# Patient Record
Sex: FEMALE | Race: WHITE | NOT HISPANIC OR LATINO
[De-identification: names, ages, dates, MRNs, and addresses within clinical notes are randomized per-mention and may not be internally consistent; named-entity substitution may affect disease eponyms.]

---

## 2017-09-22 ENCOUNTER — APPOINTMENT (OUTPATIENT)
Dept: NEUROSURGERY | Facility: CLINIC | Age: 52
End: 2017-09-22
Payer: MEDICARE

## 2017-09-22 VITALS
HEIGHT: 72 IN | WEIGHT: 168 LBS | BODY MASS INDEX: 22.75 KG/M2 | SYSTOLIC BLOOD PRESSURE: 130 MMHG | DIASTOLIC BLOOD PRESSURE: 60 MMHG

## 2017-09-22 PROBLEM — Z00.00 ENCOUNTER FOR PREVENTIVE HEALTH EXAMINATION: Status: ACTIVE | Noted: 2017-09-22

## 2017-09-22 PROCEDURE — 99204 OFFICE O/P NEW MOD 45 MIN: CPT

## 2017-10-20 ENCOUNTER — TRANSCRIPTION ENCOUNTER (OUTPATIENT)
Age: 52
End: 2017-10-20

## 2017-10-24 ENCOUNTER — TRANSCRIPTION ENCOUNTER (OUTPATIENT)
Age: 52
End: 2017-10-24

## 2019-05-06 ENCOUNTER — EMERGENCY (EMERGENCY)
Facility: HOSPITAL | Age: 54
LOS: 1 days | End: 2019-05-06
Admitting: EMERGENCY MEDICINE
Payer: MEDICARE

## 2019-05-06 PROCEDURE — 99284 EMERGENCY DEPT VISIT MOD MDM: CPT

## 2019-05-06 PROCEDURE — 93010 ELECTROCARDIOGRAM REPORT: CPT

## 2021-08-30 ENCOUNTER — APPOINTMENT (OUTPATIENT)
Dept: CARDIOLOGY | Facility: CLINIC | Age: 56
End: 2021-08-30
Payer: MEDICARE

## 2021-08-30 ENCOUNTER — NON-APPOINTMENT (OUTPATIENT)
Age: 56
End: 2021-08-30

## 2021-08-30 VITALS
TEMPERATURE: 97.8 F | HEIGHT: 72 IN | HEART RATE: 70 BPM | BODY MASS INDEX: 20.86 KG/M2 | WEIGHT: 154 LBS | DIASTOLIC BLOOD PRESSURE: 60 MMHG | OXYGEN SATURATION: 99 % | SYSTOLIC BLOOD PRESSURE: 98 MMHG

## 2021-08-30 VITALS — SYSTOLIC BLOOD PRESSURE: 94 MMHG | DIASTOLIC BLOOD PRESSURE: 64 MMHG

## 2021-08-30 DIAGNOSIS — Z78.9 OTHER SPECIFIED HEALTH STATUS: ICD-10-CM

## 2021-08-30 DIAGNOSIS — Z82.49 FAMILY HISTORY OF ISCHEMIC HEART DISEASE AND OTHER DISEASES OF THE CIRCULATORY SYSTEM: ICD-10-CM

## 2021-08-30 DIAGNOSIS — Z72.3 LACK OF PHYSICAL EXERCISE: ICD-10-CM

## 2021-08-30 DIAGNOSIS — Z87.898 PERSONAL HISTORY OF OTHER SPECIFIED CONDITIONS: ICD-10-CM

## 2021-08-30 DIAGNOSIS — Z83.3 FAMILY HISTORY OF DIABETES MELLITUS: ICD-10-CM

## 2021-08-30 DIAGNOSIS — Z80.9 FAMILY HISTORY OF MALIGNANT NEOPLASM, UNSPECIFIED: ICD-10-CM

## 2021-08-30 PROCEDURE — 93242 EXT ECG>48HR<7D RECORDING: CPT

## 2021-08-30 PROCEDURE — 93000 ELECTROCARDIOGRAM COMPLETE: CPT | Mod: 59

## 2021-08-30 PROCEDURE — 99204 OFFICE O/P NEW MOD 45 MIN: CPT

## 2021-08-30 RX ORDER — EPINEPHRINE 0.3 MG/.3ML
0.3 INJECTION INTRAMUSCULAR
Qty: 2 | Refills: 0 | Status: ACTIVE | COMMUNITY
Start: 2021-03-15

## 2021-08-30 RX ORDER — LIDOCAINE HCL/PF 200MG/10ML
200 SYRINGE (ML) INJECTION
Refills: 0 | Status: ACTIVE | COMMUNITY
Start: 2021-08-30

## 2021-08-30 RX ORDER — BUPIVACAINE HYDROCHLORIDE 2.5 MG/ML
0.25 INJECTION, SOLUTION INFILTRATION; PERINEURAL
Refills: 0 | Status: ACTIVE | COMMUNITY
Start: 2021-08-30

## 2021-08-30 NOTE — DISCUSSION/SUMMARY
[FreeTextEntry1] : Patient with episode of syncope in June 2021 after a certain kind of fish that caused upset stomach, nausea, followed by cold sweats and then brief syncope.  Most likely, this is vasovagal syncope.  We  discussed pathophysiology and ways to avoid it.  Adequate hydration and normalizing blood pressure would be important.\par \par To rule out any arrhythmias, Zio recording will be initiated for few days.  She wants to limit recording duration to under 4 days as she likes to swim.  Check echocardiogram also -mostly for RV function and size\par \par She has history of hypotension.  Her systolic blood pressure is adequate today without any significant postural change\par \par Patient has lost 10 pounds in past few months.  She does not know why.  If she continues to lose weight, she should discuss this with primary care office for further evaluation.  She feels that she has dysautonomia which affects her appetite.  No recent nausea or vomiting.  Avoiding further weight loss would also help stabilizing her blood pressure\par \par She is declining ETT.  She had ETT several years ago which caused severe pain in the pelvis and then prolapse.  Will bring us a copy of recent fasting lipid profile.\par \par I will follow her after above tests\par \par Thank you for this referral and allowing me to participate in the care of this patient.  If I can be of any further help or  if you have any questions, please do not hesitate to contact me\par \par \par Sincerely,\par \par Elvis Lehman MD, [facility], [facility]

## 2021-08-30 NOTE — REASON FOR VISIT
[FreeTextEntry1] : Chen is a pleasant 56-year-old female with history of chronic mild hypotension, cervical spondylosis, possible autonomic neuropathy.  She had  an episode of syncope in June 2021 while at the dining table after having food poisoning with some specific fish.  She had upset stomach, felt nauseous, had cold sweats and then passed out briefly.  There was no preceding palpitation, chest pain or shortness of breath.  She had an episode of vagal syncope as a teenager several decades ago.  No recurrence of presyncope or syncope in the past few months\par \par For unknown reasons, she has lost about 10 pounds in the past few months.  She has history of low blood pressure and has been told to take liberal amounts of water\par \par No past history of CAD, CHF, TIA.  She does not have significant palpitations.

## 2021-08-30 NOTE — REVIEW OF SYSTEMS
[Negative] : Cardiovascular [FreeTextEntry5] : syncope - see HPI [de-identified] : Autonomic neuropathy?

## 2021-09-09 ENCOUNTER — NON-APPOINTMENT (OUTPATIENT)
Age: 56
End: 2021-09-09

## 2021-09-13 PROCEDURE — 93244 EXT ECG>48HR<7D REV&INTERPJ: CPT

## 2021-10-04 ENCOUNTER — APPOINTMENT (OUTPATIENT)
Dept: CARDIOLOGY | Facility: CLINIC | Age: 56
End: 2021-10-04
Payer: MEDICARE

## 2021-10-04 VITALS
DIASTOLIC BLOOD PRESSURE: 72 MMHG | HEART RATE: 80 BPM | SYSTOLIC BLOOD PRESSURE: 104 MMHG | HEIGHT: 72 IN | OXYGEN SATURATION: 99 % | WEIGHT: 157 LBS | TEMPERATURE: 98 F | BODY MASS INDEX: 21.26 KG/M2

## 2021-10-04 DIAGNOSIS — Z98.890 OTHER SPECIFIED POSTPROCEDURAL STATES: ICD-10-CM

## 2021-10-04 PROCEDURE — 99215 OFFICE O/P EST HI 40 MIN: CPT

## 2021-10-04 PROCEDURE — 93306 TTE W/DOPPLER COMPLETE: CPT

## 2021-10-04 NOTE — DISCUSSION/SUMMARY
[FreeTextEntry1] : Patient with episode of syncope in June 2021 after a certain kind of fish that caused upset stomach, nausea, followed by cold sweats and then brief syncope.  Most likely, this is vasovagal syncope.  We  discussed pathophysiology and ways to avoid it.  Adequate hydration and normalizing blood pressure would be important.\par \par Event recorder showed wide complex tachycardia, asymptomatic.  NSVT cannot be excluded however it was irregular and probably aberrancy\par \par Echocardiogram today, showed normal chamber size and normal diastolic function.  Normal EF.  Mild segmental wall motion abilities such as hypokinesis of basal mid anteroseptal and inferior regions was reported.\par \par She has history of hypotension.  Her systolic blood pressure is adequate today without any significant postural change.  Unable to give beta-blockers\par \par Further weight loss has been avoided and she has gained some weight\par \par She is declining ETT.  She had ETT several years ago which caused severe pain in the pelvis and then prolapse.  Will bring us a copy of recent fasting lipid profile.\par \par She would benefit from further evaluation with cardiac MRI.\par \par I will follow her after above tests\par \par Thank you for this referral and allowing me to participate in the care of this patient.  If I can be of any further help or  if you have any questions, please do not hesitate to contact me\par \par \par Sincerely,\par \par Elvis Lehman MD, [facility], [facility]

## 2021-10-04 NOTE — REVIEW OF SYSTEMS
[Negative] : Heme/Lymph [FreeTextEntry5] : syncope - see HPI [de-identified] : Autonomic neuropathy?

## 2021-10-13 ENCOUNTER — EMERGENCY (EMERGENCY)
Facility: HOSPITAL | Age: 56
LOS: 1 days | End: 2021-10-13
Admitting: EMERGENCY MEDICINE
Payer: MEDICARE

## 2021-10-13 PROCEDURE — 99285 EMERGENCY DEPT VISIT HI MDM: CPT

## 2021-10-13 PROCEDURE — 70450 CT HEAD/BRAIN W/O DYE: CPT | Mod: 26

## 2021-10-13 PROCEDURE — 71045 X-RAY EXAM CHEST 1 VIEW: CPT | Mod: 26

## 2021-10-13 PROCEDURE — 93010 ELECTROCARDIOGRAM REPORT: CPT

## 2021-10-14 ENCOUNTER — APPOINTMENT (OUTPATIENT)
Dept: CARDIOLOGY | Facility: CLINIC | Age: 56
End: 2021-10-14
Payer: MEDICARE

## 2021-10-14 ENCOUNTER — NON-APPOINTMENT (OUTPATIENT)
Age: 56
End: 2021-10-14

## 2021-10-14 VITALS
WEIGHT: 154 LBS | SYSTOLIC BLOOD PRESSURE: 130 MMHG | OXYGEN SATURATION: 98 % | TEMPERATURE: 96.2 F | HEIGHT: 72 IN | HEART RATE: 114 BPM | BODY MASS INDEX: 20.86 KG/M2 | DIASTOLIC BLOOD PRESSURE: 78 MMHG

## 2021-10-14 PROCEDURE — 93242 EXT ECG>48HR<7D RECORDING: CPT

## 2021-10-14 PROCEDURE — 99215 OFFICE O/P EST HI 40 MIN: CPT

## 2021-10-14 PROCEDURE — 99214 OFFICE O/P EST MOD 30 MIN: CPT

## 2021-10-14 PROCEDURE — 93000 ELECTROCARDIOGRAM COMPLETE: CPT | Mod: 59

## 2021-10-14 NOTE — REVIEW OF SYSTEMS
[Negative] : Heme/Lymph [FreeTextEntry5] : syncope - see HPI [de-identified] : Autonomic neuropathy?

## 2021-10-14 NOTE — REASON FOR VISIT
[FreeTextEntry1] : Chen is a pleasant 56-year-old female with history of chronic mild hypotension, cervical spondylosis, possible autonomic neuropathy.  She had  an episode of syncope in June 2021 while at the dining table after having food poisoning with some specific fish.  She had upset stomach, felt nauseous, had cold sweats and then passed out briefly.  There was no preceding palpitation, chest pain or shortness of breath.  She had an episode of vagal syncope as a teenager several decades ago.  No recurrence of presyncope or syncope in the past few months\par \par For unknown reasons, she has lost about 10 pounds in the past few months.  She has history of low blood pressure and has been told to take liberal amounts of water\par \par The patient had recent admission to the hospital with episode of syncope.  She was admitted overnight.  She ruled out for acute coronary syndrome.  No evidence of any cardiac arrhythmia.  She had a recent cardiac MRI which was reviewed.  No evidence of any structural heart disease.  Occasional palpitations.  Continues to have dizzy spells.\par \par No past history of CAD, CHF, TIA.  She does not have significant palpitations.

## 2021-10-14 NOTE — DISCUSSION/SUMMARY
[FreeTextEntry1] : Patient with episode of syncope in June 2021 after a certain kind of fish that caused upset stomach, nausea, followed by cold sweats and then brief syncope.  Most likely, this is vasovagal syncope.  We  discussed pathophysiology and ways to avoid it.  Adequate hydration and normalizing blood pressure would be important.\par \par Event recorder showed wide complex tachycardia, asymptomatic.  NSVT cannot be excluded however it was irregular and probably aberrancy\par \par Echocardiogram today, showed normal chamber size and normal diastolic function.  Normal EF.  Mild segmental wall motion abilities such as hypokinesis of basal mid anteroseptal and inferior regions was reported.\par \par She has history of hypotension.  Her systolic blood pressure is adequate today without any significant postural change.  Unable to give beta-blockers\par \par Further weight loss has been avoided and she has gained some weight\par \par She is declining ETT.  She had ETT several years ago which caused severe pain in the pelvis and then prolapse.  Will bring us a copy of recent fasting lipid profile.\par \par She would benefit from further evaluation with cardiac MRI.\par \par Hospital records were reviewed.  No evidence of acute coronary syndrome or any significant cardiac arrhythmia.  ECG reviewed.  Cardiac MRI reviewed.  No evidence of any structural heart disease.  Increase hydration discussed with the patient.  ECG shows frequent PVCs.  Zio patch will be done for 1 week to rule out any sustained cardiac arrhythmia causing the symptoms.  Follow-up after Zio patch.  I also advised that she will follow-up with neurology.

## 2021-10-14 NOTE — ASSESSMENT
[FreeTextEntry1] : Reviewed today:\par -EKG 8/30/2021: Sinus rhythm, unremarkable.\par Hospital records reviewed.  Recent admission overnight for dizziness.  No evidence of acute coronary syndrome.

## 2021-10-27 ENCOUNTER — NON-APPOINTMENT (OUTPATIENT)
Age: 56
End: 2021-10-27

## 2021-10-28 PROCEDURE — 93244 EXT ECG>48HR<7D REV&INTERPJ: CPT

## 2021-11-15 ENCOUNTER — NON-APPOINTMENT (OUTPATIENT)
Age: 56
End: 2021-11-15

## 2021-11-16 ENCOUNTER — APPOINTMENT (OUTPATIENT)
Dept: CARDIOLOGY | Facility: CLINIC | Age: 56
End: 2021-11-16

## 2021-11-18 ENCOUNTER — APPOINTMENT (OUTPATIENT)
Dept: CARDIOLOGY | Facility: CLINIC | Age: 56
End: 2021-11-18
Payer: MEDICARE

## 2021-11-18 VITALS
OXYGEN SATURATION: 98 % | HEIGHT: 72 IN | TEMPERATURE: 97.5 F | HEART RATE: 87 BPM | DIASTOLIC BLOOD PRESSURE: 60 MMHG | BODY MASS INDEX: 20.86 KG/M2 | WEIGHT: 154 LBS | SYSTOLIC BLOOD PRESSURE: 100 MMHG

## 2021-11-18 DIAGNOSIS — R55 SYNCOPE AND COLLAPSE: ICD-10-CM

## 2021-11-18 DIAGNOSIS — I95.9 HYPOTENSION, UNSPECIFIED: ICD-10-CM

## 2021-11-18 DIAGNOSIS — I42.9 CARDIOMYOPATHY, UNSPECIFIED: ICD-10-CM

## 2021-11-18 DIAGNOSIS — M48.02 SPINAL STENOSIS, CERVICAL REGION: ICD-10-CM

## 2021-11-18 DIAGNOSIS — G90.9 DISORDER OF THE AUTONOMIC NERVOUS SYSTEM, UNSPECIFIED: ICD-10-CM

## 2021-11-18 PROCEDURE — 99214 OFFICE O/P EST MOD 30 MIN: CPT

## 2021-11-18 RX ORDER — ALPRAZOLAM 0.25 MG/1
0.25 TABLET ORAL DAILY
Refills: 0 | Status: ACTIVE | COMMUNITY

## 2021-11-18 NOTE — DISCUSSION/SUMMARY
[FreeTextEntry1] : Patient with episode of syncope in June 2021 after a certain kind of fish that caused upset stomach, nausea, followed by cold sweats and then brief syncope.  Most likely, this is vasovagal syncope.  We  discussed pathophysiology and ways to avoid it.  Adequate hydration and normalizing blood pressure would be important.\par \par Event recorder in August 2021 showed wide complex tachycardia, asymptomatic.  NSVT cannot be excluded - it was irregular and probably aberrancy.  However, on beta-blockers, repeat ZIO recording in mid October 2021 again showed NSVT and PVC burden of 2.3%.  Unfortunately, due to low blood pressure, beta-blocker dose had to be reduced.\par \par She canceled her  Lexiscan stress test and SPECT last week.  She does not have any chest pain.  She did have an episode of tachycardia and anxiety with heart rate of 160 lasting 20 minutes for which she went to the ER on October 13, 2021 at [facility].  We requested a copy of those records.\par \par She scheduled to see Dr. August  at [place] for EP consultation and arrhythmia management.  Patient also wants to have a tilt table testing which she will have at [place].\par \par Echocardiogram this year, showed normal chamber size and normal diastolic function.  Normal EF.  Mild segmental wall motion abilities such as hypokinesis of basal mid anteroseptal and inferior regions was reported-however cardiac MRI was unremarkable.\par \par She has history of hypotension.  Her systolic blood pressure is borderline adequate today without any significant postural change.  Unable to increase blockers\par \par She is declining ETT.  She had ETT several years ago which caused severe pain in the pelvis and then prolapse.  \par \par She will bring us a copy of recent fasting lipid profile.\par \par Cardiac MRI: Normal LVEF and wall motion.  No late gadolinium enhancement.  RV function and structure normal.\par \par Patient will call to make further appointments once she consults and has tests with Dr. August \par \par Thank you for this referral and allowing me to participate in the care of this patient.  If I can be of any further help or  if you have any questions, please do not hesitate to contact me\par \par \[place] Sincerely,\par \par Elvis Lehman MD, [facility], [facility]

## 2021-11-18 NOTE — ASSESSMENT
[FreeTextEntry1] : Reviewed today:\par -EKG 8/30/2021: Sinus rhythm, unremarkable.\par -Zio recording for 5 to 6 days in October 2021: She had 1 run of NSVT, 7 beats, rate around 178, asymptomatic.  Occasional PVC throughout the monitoring at 2.3% burden.\par -ZIO recording August 2021: PVC burden was 1.4%.  She had asymptomatic NSVT, 9 beats.\par -Cardiac MRI October 2021: Unremarkable

## 2021-11-18 NOTE — REVIEW OF SYSTEMS
[Negative] : Heme/Lymph [FreeTextEntry5] : syncope - see HPI [de-identified] : Autonomic neuropathy?

## 2021-11-18 NOTE — REASON FOR VISIT
[FreeTextEntry1] : Chen is a pleasant 56-year-old female with history of chronic mild hypotension, cervical spondylosis, possible autonomic neuropathy.  She had  an episode of syncope in June 2021 while at the dining table after having food poisoning with some specific fish.  She had upset stomach, felt nauseous, had cold sweats and then passed out briefly.  There was no preceding palpitation, chest pain or shortness of breath.  She had an episode of vagal syncope as a teenager several decades ago.  No recurrence of presyncope or syncope in the past few months\par \par For unknown reasons, she had lost about 10 pounds in the  few months end of summer however the weight is now steady.  She has history of low blood pressure and has been told to take liberal amounts of water\par \par She was on 25 mg of metoprolol.  She reduced the dose to half a tablet.  Systolic blood pressure is 100 mmHg today.\par \par No past history of CAD, CHF, TIA.  She does not have significant palpitations.\par \par ZIO recording mid-October again showed NSVT, asymptomatic.  PVC burden has increased to 2.4% in October 2021\par \par Patient has several nonspecific and vague symptoms which are difficult to correlate and not consistent.  He admits to anxiety.  She is going to see psychiatry.

## 2022-02-08 RX ORDER — METOPROLOL SUCCINATE 25 MG/1
25 TABLET, EXTENDED RELEASE ORAL DAILY
Qty: 45 | Refills: 2 | Status: ACTIVE | COMMUNITY
Start: 2021-10-27 | End: 1900-01-01

## 2022-05-17 ENCOUNTER — APPOINTMENT (OUTPATIENT)
Dept: CARDIOLOGY | Facility: CLINIC | Age: 57
End: 2022-05-17

## 2022-09-14 ENCOUNTER — NON-APPOINTMENT (OUTPATIENT)
Age: 57
End: 2022-09-14

## 2022-09-14 ENCOUNTER — APPOINTMENT (OUTPATIENT)
Dept: OPHTHALMOLOGY | Facility: CLINIC | Age: 57
End: 2022-09-14

## 2022-09-14 PROCEDURE — 99204 OFFICE O/P NEW MOD 45 MIN: CPT

## 2023-03-22 ENCOUNTER — APPOINTMENT (OUTPATIENT)
Dept: VASCULAR SURGERY | Facility: CLINIC | Age: 58
End: 2023-03-22
Payer: MEDICARE

## 2023-03-22 VITALS
WEIGHT: 160 LBS | HEIGHT: 72 IN | SYSTOLIC BLOOD PRESSURE: 100 MMHG | DIASTOLIC BLOOD PRESSURE: 72 MMHG | BODY MASS INDEX: 21.67 KG/M2

## 2023-03-22 PROCEDURE — 99203 OFFICE O/P NEW LOW 30 MIN: CPT

## 2023-03-22 NOTE — PHYSICAL EXAM
[JVD] : no jugular venous distention  [Carotid Bruits] : no carotid bruits [Normal Breath Sounds] : Normal breath sounds [Normal Heart Sounds] : normal heart sounds [Normal Rate and Rhythm] : normal rate and rhythm [2+] : left 2+ [Ankle Swelling (On Exam)] : not present [Varicose Veins Of Lower Extremities] : bilaterally [Ankle Swelling On The Right] : mild [] : not present [Abdomen Masses] : No abdominal masses [Abdomen Tenderness] : ~T ~M No abdominal tenderness [No Rash or Lesion] : No rash or lesion [Anxious] : anxious [de-identified] : Very emotional

## 2023-03-22 NOTE — HISTORY OF PRESENT ILLNESS
[FreeTextEntry1] : 58-year-old white female is being self-referred to be evaluated for possible vascular problems.  She suffers from multiple autoimmune diseases associated with chronic pain, neuralgias and hypersensitivity.  She is concerned about circulation to the legs.

## 2023-03-22 NOTE — REVIEW OF SYSTEMS
[Fever] : no fever [Chills] : no chills [Feeling Poorly] : not feeling poorly [Feeling Tired] : not feeling tired [Chest Pain] : no chest pain [Palpitations] : no palpitations [Leg Claudication] : no intermittent leg claudication [Lower Ext Edema] : no lower extremity edema [Limb Pain] : limb pain [Limb Swelling] : no limb swelling [Limb Weakness] : limb weakness [Difficulty Walking] : difficulty walking [Anxiety] : anxiety [Emotional Problems] : emotional problems [Negative] : Respiratory

## 2023-03-22 NOTE — ASSESSMENT
[FreeTextEntry1] : No evidence of arterial insufficiency.  I recommended to obtain bilateral venous sonogram.

## 2023-03-28 ENCOUNTER — APPOINTMENT (OUTPATIENT)
Dept: VASCULAR SURGERY | Facility: CLINIC | Age: 58
End: 2023-03-28
Payer: MEDICARE

## 2023-03-28 VITALS
BODY MASS INDEX: 21.67 KG/M2 | SYSTOLIC BLOOD PRESSURE: 106 MMHG | DIASTOLIC BLOOD PRESSURE: 70 MMHG | HEIGHT: 72 IN | WEIGHT: 160 LBS

## 2023-03-28 DIAGNOSIS — I83.90 ASYMPTOMATIC VARICOSE VEINS OF UNSPECIFIED LOWER EXTREMITY: ICD-10-CM

## 2023-03-28 PROCEDURE — 93970 EXTREMITY STUDY: CPT

## 2023-03-28 PROCEDURE — 99213 OFFICE O/P EST LOW 20 MIN: CPT

## 2023-03-28 NOTE — PHYSICAL EXAM
[JVD] : no jugular venous distention  [Normal Breath Sounds] : Normal breath sounds [1+] : left 1+ [Ankle Swelling (On Exam)] : not present [Varicose Veins Of Lower Extremities] : not present [] : not present [No Rash or Lesion] : No rash or lesion [de-identified] : No acute distress

## 2023-03-28 NOTE — REVIEW OF SYSTEMS
[Fever] : no fever [Chills] : no chills [Feeling Tired] : feeling tired [Leg Claudication] : no intermittent leg claudication [Lower Ext Edema] : no lower extremity edema [Shortness Of Breath] : no shortness of breath [Abdominal Pain] : no abdominal pain [Arthralgias] : arthralgias [Limb Pain] : limb pain [Negative] : ENT

## 2023-03-28 NOTE — HISTORY OF PRESENT ILLNESS
[FreeTextEntry1] : Patient had bilateral venous ultrasound done which was negative for DVT.  There is no evidence of phlebitis.

## 2023-03-28 NOTE — ASSESSMENT
[FreeTextEntry1] : Patient is recommended to wear compression stockings.  Follow-up appointment on as needed basis.

## 2024-08-21 ENCOUNTER — NON-APPOINTMENT (OUTPATIENT)
Age: 59
End: 2024-08-21

## 2024-08-21 ENCOUNTER — APPOINTMENT (OUTPATIENT)
Dept: OPHTHALMOLOGY | Facility: CLINIC | Age: 59
End: 2024-08-21
Payer: MEDICARE

## 2024-08-21 PROCEDURE — 92014 COMPRE OPH EXAM EST PT 1/>: CPT

## 2025-02-04 ENCOUNTER — APPOINTMENT (OUTPATIENT)
Dept: VASCULAR SURGERY | Facility: CLINIC | Age: 60
End: 2025-02-04
Payer: MEDICARE

## 2025-02-04 VITALS
SYSTOLIC BLOOD PRESSURE: 124 MMHG | HEIGHT: 72 IN | HEART RATE: 82 BPM | OXYGEN SATURATION: 99 % | DIASTOLIC BLOOD PRESSURE: 74 MMHG | WEIGHT: 150 LBS | BODY MASS INDEX: 20.32 KG/M2

## 2025-02-04 DIAGNOSIS — G62.9 POLYNEUROPATHY, UNSPECIFIED: ICD-10-CM

## 2025-02-04 DIAGNOSIS — F32.A DEPRESSION, UNSPECIFIED: ICD-10-CM

## 2025-02-04 DIAGNOSIS — I87.2 VENOUS INSUFFICIENCY (CHRONIC) (PERIPHERAL): ICD-10-CM

## 2025-02-04 DIAGNOSIS — I83.90 ASYMPTOMATIC VARICOSE VEINS OF UNSPECIFIED LOWER EXTREMITY: ICD-10-CM

## 2025-02-04 PROCEDURE — 99213 OFFICE O/P EST LOW 20 MIN: CPT

## 2025-02-04 RX ORDER — AMMONIUM LACTATE 12 %
12 CREAM (GRAM) TOPICAL
Refills: 0 | Status: ACTIVE | COMMUNITY

## 2025-02-04 RX ORDER — MIDODRINE HYDROCHLORIDE 2.5 MG/1
2.5 TABLET ORAL
Refills: 0 | Status: ACTIVE | COMMUNITY

## 2025-02-04 RX ORDER — CEVIMELINE HYDROCHLORIDE 30 MG/1
30 CAPSULE ORAL
Refills: 0 | Status: ACTIVE | COMMUNITY

## 2025-02-04 RX ORDER — HYDROCORTISONE 25 MG/G
2.5 CREAM TOPICAL
Refills: 0 | Status: ACTIVE | COMMUNITY

## 2025-03-04 ENCOUNTER — APPOINTMENT (OUTPATIENT)
Dept: VASCULAR SURGERY | Facility: CLINIC | Age: 60
End: 2025-03-04
Payer: MEDICARE

## 2025-03-04 VITALS
DIASTOLIC BLOOD PRESSURE: 62 MMHG | BODY MASS INDEX: 20.45 KG/M2 | HEART RATE: 81 BPM | HEIGHT: 72 IN | WEIGHT: 151 LBS | SYSTOLIC BLOOD PRESSURE: 98 MMHG | OXYGEN SATURATION: 97 %

## 2025-03-04 DIAGNOSIS — I87.2 VENOUS INSUFFICIENCY (CHRONIC) (PERIPHERAL): ICD-10-CM

## 2025-03-04 DIAGNOSIS — Z79.899 OTHER LONG TERM (CURRENT) DRUG THERAPY: ICD-10-CM

## 2025-03-04 DIAGNOSIS — I83.90 ASYMPTOMATIC VARICOSE VEINS OF UNSPECIFIED LOWER EXTREMITY: ICD-10-CM

## 2025-03-04 PROCEDURE — 99214 OFFICE O/P EST MOD 30 MIN: CPT

## 2025-03-04 PROCEDURE — 93970 EXTREMITY STUDY: CPT

## 2025-03-04 RX ORDER — ALENDRONATE SODIUM 70 MG/1
70 TABLET ORAL
Qty: 90 | Refills: 1 | Status: ACTIVE | COMMUNITY

## 2025-03-04 RX ORDER — LEUCOVORIN CALCIUM 5 MG/1
5 TABLET ORAL
Refills: 0 | Status: ACTIVE | COMMUNITY

## 2025-03-04 RX ORDER — LEVOMEFOLATE CALCIUM 15 MG
15 TABLET ORAL DAILY
Refills: 0 | Status: ACTIVE | COMMUNITY

## 2025-03-04 RX ORDER — HYPOCHLOROUS ACID/SODIUM CHLOR 0.01 %
SPRAY, NON-AEROSOL (ML) TOPICAL
Refills: 0 | Status: ACTIVE | COMMUNITY

## 2025-03-04 RX ORDER — HYDROCORTISONE 25 MG/G
2.5 CREAM TOPICAL
Refills: 0 | Status: ACTIVE | COMMUNITY

## 2025-03-04 RX ORDER — DICLOFENAC SODIUM 10 MG/G
1 GEL TOPICAL
Refills: 0 | Status: ACTIVE | COMMUNITY

## 2025-03-04 RX ORDER — METHOTREXATE 2.5 MG/1
2.5 TABLET ORAL
Refills: 0 | Status: ACTIVE | COMMUNITY

## 2025-03-04 RX ORDER — CARBOXYMETHYLCELLULOSE SODIUM 5 MG/ML
SOLUTION/ DROPS OPHTHALMIC
Refills: 0 | Status: ACTIVE | COMMUNITY

## 2025-03-04 RX ORDER — ARNICA FLOWER EXTRACT
LIQUID (GRAM) MISCELLANEOUS
Refills: 0 | Status: ACTIVE | COMMUNITY

## 2025-03-04 RX ORDER — DOXYCYCLINE HYCLATE 20 MG/1
20 TABLET ORAL TWICE DAILY
Refills: 0 | Status: ACTIVE | COMMUNITY

## 2025-03-21 ENCOUNTER — APPOINTMENT (OUTPATIENT)
Dept: VASCULAR SURGERY | Facility: CLINIC | Age: 60
End: 2025-03-21
Payer: MEDICARE

## 2025-03-21 PROCEDURE — 37765 STAB PHLEB VEINS XTR 10-20: CPT | Mod: RT

## 2025-03-28 ENCOUNTER — APPOINTMENT (OUTPATIENT)
Dept: VASCULAR SURGERY | Facility: CLINIC | Age: 60
End: 2025-03-28
Payer: MEDICARE

## 2025-03-28 VITALS
SYSTOLIC BLOOD PRESSURE: 104 MMHG | DIASTOLIC BLOOD PRESSURE: 62 MMHG | HEIGHT: 72 IN | HEART RATE: 83 BPM | OXYGEN SATURATION: 98 % | WEIGHT: 151 LBS | BODY MASS INDEX: 20.45 KG/M2

## 2025-03-28 DIAGNOSIS — I83.90 ASYMPTOMATIC VARICOSE VEINS OF UNSPECIFIED LOWER EXTREMITY: ICD-10-CM

## 2025-03-28 PROCEDURE — 99024 POSTOP FOLLOW-UP VISIT: CPT

## 2025-04-22 ENCOUNTER — APPOINTMENT (OUTPATIENT)
Dept: VASCULAR SURGERY | Facility: CLINIC | Age: 60
End: 2025-04-22
Payer: MEDICARE

## 2025-04-22 VITALS
BODY MASS INDEX: 20.45 KG/M2 | SYSTOLIC BLOOD PRESSURE: 112 MMHG | HEART RATE: 83 BPM | HEIGHT: 72 IN | OXYGEN SATURATION: 98 % | WEIGHT: 151 LBS | DIASTOLIC BLOOD PRESSURE: 72 MMHG

## 2025-04-22 DIAGNOSIS — I83.90 ASYMPTOMATIC VARICOSE VEINS OF UNSPECIFIED LOWER EXTREMITY: ICD-10-CM

## 2025-04-22 PROCEDURE — 99212 OFFICE O/P EST SF 10 MIN: CPT

## 2025-05-02 ENCOUNTER — APPOINTMENT (OUTPATIENT)
Dept: VASCULAR SURGERY | Facility: CLINIC | Age: 60
End: 2025-05-02

## 2025-09-06 ENCOUNTER — NON-APPOINTMENT (OUTPATIENT)
Age: 60
End: 2025-09-06

## 2025-09-08 ENCOUNTER — APPOINTMENT (OUTPATIENT)
Dept: OPHTHALMOLOGY | Facility: CLINIC | Age: 60
End: 2025-09-08
Payer: MEDICARE

## 2025-09-08 ENCOUNTER — NON-APPOINTMENT (OUTPATIENT)
Age: 60
End: 2025-09-08

## 2025-09-08 PROCEDURE — 92014 COMPRE OPH EXAM EST PT 1/>: CPT
